# Patient Record
(demographics unavailable — no encounter records)

---

## 2025-05-05 NOTE — PHYSICAL EXAM
[Well Developed] : well developed [Well Nourished] : well nourished [Normal Venous Pressure] : normal venous pressure [No Carotid Bruit] : no carotid bruit [Normal S1, S2] : normal S1, S2 [No Murmur] : no murmur [No Rub] : no rub [No Gallop] : no gallop [Clear Lung Fields] : clear lung fields [Good Air Entry] : good air entry [No Respiratory Distress] : no respiratory distress  [No Edema] : no edema [No Cyanosis] : no cyanosis [No Clubbing] : no clubbing [Moves all extremities] : moves all extremities [Alert and Oriented] : alert and oriented [Normal memory] : normal memory

## 2025-05-06 NOTE — DISCUSSION/SUMMARY
[EKG obtained to assist in diagnosis and management of assessed problem(s)] : EKG obtained to assist in diagnosis and management of assessed problem(s) [FreeTextEntry1] : Ms. Yelitza Wolfe is a 37-year-old woman with  1. PEC with severe features in the third trimester post  delivery.  2. Former smoker, smoked 1 pack per week for 3-4 years, quit >10 years ago.  3. Family history of HTN and HLD.  There is no evidence of acute decompensated heart failure, angina, clinically significant arrhythmia or valvular heart disease. NYHA Class I. Patient is a non-smoker. Patient has no known DM or HLD. No reported family history of premature CAD. ECG sinus rhythm, no ST-T changes. TTE not indicated in the absence of symptoms, normal exam and normal ECG.   The patient and I discussed the following: the need for aspirin in future pregnancies for preeclampsia prophylaxis.  Pre-eclampsia is associated with an increased risk of HTN, diabetes mellitus, lipid disorder, heart failure, renal disease, stroke, venous thromboembolism and coronary artery disease. Breastfeeding is encouraged as this will decrease cardio-metabolic risk factors and is associated with decreased long-term CVD (at least one-month, likely dose response for more months of breastfeeding). During breastfeeding, acceptable meds for BP include labetalol, nifedipine, furosemide, metoprolol, captopril, enalapril, hydralazine and spironolactone. Avoid estrogen as a form of birth control if her blood pressure were uncontrolled in the future. If BP is controlled in the future and she is started on estrogen, BP should be closely monitored. Pregnancy-related HTN has been shown to be associated with higher BP in offspring compared with offspring of patients who remain normotensive during pregnancy. There is an increased risk of preeclampsia recurrence in subsequent pregnancies. It is reasonable to check fasting lipids, Lp(a) and HbA1c at least 6 weeks postpartum. Her blood pressure today is at goal. Goal BP <140/90 mm Hg. I advised her to seek medical attention if BP>160/110 mm Hg. My nurse team will call her back in 1 week for a blood pressure check. I educated her on proper BP recording technique. Continue nifedipine 30 mg QD for now.   I recommended lifestyle modifications for long-term cardiovascular health especially given her family history of HTN/HLD, personal history of being a former smoker, to lower cardiovascular disease risk. I recommended at least 150 minutes per week of moderate-intensity exercise or 75 minutes of vigorous activity combined with muscle strengthening activities at least twice weekly. I reviewed cardiac alarm symptoms with the patient and next steps if the patient experiences such symptoms.   I recommend a follow-up appointment with me in 1 month. I will likely check lipids, Lp(a) and A1c at that time.    The patient agreed with the above recommendations and was appreciative of the care provided. The patient should continue seeing their primary care physician for overall health maintenance. I am happy to answer any further questions so please call my office if needed. Thank you for the opportunity to participate in the care of Ms. Wolfe.     Ponce Hernandez DO, [facility] Cardio-Obstetrics Cardiologist Adult Congenital Heart Disease Cardiologist [address], [place] [address] Office telephone number [phone]

## 2025-05-06 NOTE — REASON FOR VISIT
[Spouse] : spouse [FreeTextEntry3] : Radha Tapia MD - formerly [facility] [address], [place] [id number] [FreeTextEntry1] : History was provided by patient and chart review.

## 2025-05-06 NOTE — HISTORY OF PRESENT ILLNESS
[FreeTextEntry1] : Ms. Yelitza Wolfe was seen at the [facility] located in [place], [place], on 2025. The patient is a 37-year-old woman with PEC with severe features in the third trimester post  delivery. She was referred specifically for blood pressure management.   Patient developed PEC with severe features in the third trimester of pregnancy. She then underwent  delivery on 4/10/2025 at [facility]. She was discharged home on 2025. She was discharged home on labetalol 200 mg q8h. She was switched to nifedipine 30 mg QD.    Patient denies chest pain, palpitations, vision changes, headache, edema, orthopnea, dyspnea or syncope.   Family history: MGM had CAD s/p PCI. Mother with HTN. Father with HLD.  Social history: patient denies smoking, drinking alcohol or using illicit drugs. . . Former smoker (1 pack per week for 3-4 years; quit >10 years ago). CV meds: nifedipine 30 mg QD

## 2025-05-06 NOTE — CARDIOLOGY SUMMARY
[de-identified] : 5/6/2025 sinus rhythm with ventricular rate of 77 bpm. OK interval 162 ms and QRS duration of 76 ms. Normal axis and intervals. QTc 405 ms. No chamber enlargement or hypertrophy. No ST/T changes.  ECG 8/13/2018 SR, HR 74 bpm, QTc 402 ms  [de-identified] : Labs 8/30/2024 TG 80, , HDL 61 and LDL 95

## 2025-06-06 NOTE — REASON FOR VISIT
[FreeTextEntry3] :  Radha Tapia MD - [facility] [address], [place] [id number] [FreeTextEntry1] : History was provided by patient and chart review. She was alone during the interview.

## 2025-06-06 NOTE — CARDIOLOGY SUMMARY
[de-identified] : No ECG performed today [de-identified] : 11/7/2018 ETT [facility] Protocol 9 min and 0 seconds No ECG changes suggestive of ischemia [de-identified] : Labs 8/30/2024 TG 80, , HDL 61 and LDL 95  A1c 5.0% on 8/26/2021

## 2025-06-06 NOTE — CARDIOLOGY SUMMARY
[de-identified] : No ECG performed today [de-identified] : 11/7/2018 ETT [facility] Protocol 9 min and 0 seconds No ECG changes suggestive of ischemia [de-identified] : Labs 8/30/2024 TG 80, , HDL 61 and LDL 95  A1c 5.0% on 8/26/2021

## 2025-06-06 NOTE — HISTORY OF PRESENT ILLNESS
[FreeTextEntry1] : Ms. Yelitza Wolfe was seen at the [facility] located in [place], [place], on 2025. The patient is a 37-year-old woman with PEC with severe features in the third trimester post  delivery. She was last seen by me on 2025. I recommend a follow-up appointment with me in 1 month. She returns today for follow up.   As you recall, patient developed PEC with severe features in the third trimester of pregnancy. She then underwent  delivery on 4/10/2025 at [facility]. She was discharged home on 2025. She was discharged home on labetalol 200 mg q8h. She was then switched to nifedipine 30 mg QD.  She is now off the nifedipine. I recommended stopping it last visit.   There is no reported lightheadedness, chest pain, palpitations, edema, orthopnea, dyspnea or syncope. She feels well.  Family history: MGM had CAD s/p PCI. Mother with HTN. Father with HLD. Social history: patient denies smoking, drinking alcohol or using illicit drugs. . . Former smoker (1 pack per week for 3-4 years; quit >10 years ago). CV meds: none

## 2025-06-06 NOTE — DISCUSSION/SUMMARY
[FreeTextEntry1] : Ms. Yelitza Wolfe is a 37-year-old woman.  There is no evidence of acute decompensated heart failure, angina, clinically significant arrhythmia or valvular heart disease. She feels very well today. NYHA Class I. Patient is a non-smoker. No reported family history of premature CAD. ECG sinus rhythm, no ST-T changes from prior visit.  1. PEC with severe features in the third trimester post  delivery. Resolved.  2. Former smoker, smoked 1 pack per week for 3-4 years, quit >10 years ago. 3. Family history of HTN and HLD.  The patient and I discussed PEC in detail last visit. I recommended lifestyle modifications for long-term cardiovascular health especially given her family history of HTN/HLD, personal history of being a former smoker, to lower cardiovascular disease risk. She is not on any BP meds.   There is no guidance on how long to follow patients with PEC long-term. We have both agreed to have her see myself or another cardiologist annually. Return precautions given. I recommend a follow-up appointment with me in 12 months. I will check lipids, Lp(a) and A1c now. I will follow up on those results.  The patient agreed with the above recommendations and was appreciative of the care provided. The patient should continue seeing their primary care physician for overall health maintenance. I am happy to answer any further questions so please call my office if needed. Thank you for the opportunity to participate in the care of Ms. Wolfe.   Ponce Hernandez DO, [facility] Cardio-Obstetrics Cardiologist Adult Congenital Heart Disease Cardiologist [address], [place] [address] Office telephone number [phone].

## 2025-06-06 NOTE — DISCUSSION/SUMMARY
[FreeTextEntry1] : Ms. Yelitza Wolfe is a 37-year-old woman.  There is no evidence of acute decompensated heart failure, angina, clinically significant arrhythmia or valvular heart disease. She feels very well today. NYHA Class I. Patient is a non-smoker. No reported family history of premature CAD. ECG sinus rhythm, no ST-T changes from prior visit.  1. PEC with severe features in the third trimester post  delivery. Resolved.  2. Former smoker, smoked 1 pack per week for 3-4 years, quit >10 years ago. 3. Family history of HTN and HLD.  The patient and I discussed PEC in detail last visit. I recommended lifestyle modifications for long-term cardiovascular health especially given her family history of HTN/HLD, personal history of being a former smoker, to lower cardiovascular disease risk. She is not on any BP meds.   There is no guidance on how long to follow patients with PEC long-term. We have both agreed to have her see myself or another cardiologist annually. Return precautions given. I recommend a follow-up appointment with me in 12 months. I will check lipids, Lp(a) and A1c now. I will follow up on those results.  The patient agreed with the above recommendations and was appreciative of the care provided. The patient should continue seeing their primary care physician for overall health maintenance. I am happy to answer any further questions so please call my office if needed. Thank you for the opportunity to participate in the care of Ms. Wolfe.   Ponce Hernandez DO, [facility] Cardio-Obstetrics Cardiologist Adult Congenital Heart Disease Cardiologist [address], [place] [address] Office telephone number [phone].

## 2025-06-06 NOTE — HISTORY OF PRESENT ILLNESS
[FreeTextEntry1] : Ms. Yelitza Wolfe was seen at the [facility] located in [place], [place], on 2025. The patient is a 37-year-old woman with PEC with severe features in the third trimester post  delivery. She was last seen by me on 2025. I recommend a follow-up appointment with me in 1 month. She returns today for follow up.   As you recall, patient developed PEC with severe features in the third trimester of pregnancy. She then underwent  delivery on 4/10/2025 at [facility]. She was discharged home on 2025. She was discharged home on labetalol 200 mg q8h. She was then switched to nifedipine 30 mg QD.  She is now off the nifedipine. I recommended stopping it last visit.   There is no reported lightheadedness, chest pain, palpitations, edema, orthopnea, dyspnea or syncope. She feels well.  Family history: MGM had CAD s/p PCI. Mother with HTN. Father with HLD. Social history: patient denies smoking, drinking alcohol or using illicit drugs. . . Former smoker (1 pack per week for 3-4 years; quit >10 years ago). CV meds: none